# Patient Record
Sex: FEMALE | Race: WHITE | NOT HISPANIC OR LATINO | URBAN - METROPOLITAN AREA
[De-identification: names, ages, dates, MRNs, and addresses within clinical notes are randomized per-mention and may not be internally consistent; named-entity substitution may affect disease eponyms.]

---

## 2021-02-23 ENCOUNTER — APPOINTMENT (RX ONLY)
Dept: URBAN - METROPOLITAN AREA CLINIC 58 | Facility: CLINIC | Age: 61
Setting detail: DERMATOLOGY
End: 2021-02-23

## 2021-02-23 PROBLEM — D48.5 NEOPLASM OF UNCERTAIN BEHAVIOR OF SKIN: Status: ACTIVE | Noted: 2021-02-23

## 2021-02-23 PROCEDURE — 11102 TANGNTL BX SKIN SINGLE LES: CPT

## 2021-02-23 PROCEDURE — 11103 TANGNTL BX SKIN EA SEP/ADDL: CPT

## 2021-02-23 PROCEDURE — ? BIOPSY BY SHAVE METHOD

## 2021-02-23 NOTE — PROCEDURE: BIOPSY BY SHAVE METHOD
Detail Level: Detailed
Depth Of Biopsy: dermis
Was A Bandage Applied: Yes
Size Of Lesion In Cm: 2.1
X Size Of Lesion In Cm: 0
Biopsy Type: H and E
Biopsy Method: Personna blade
Anesthesia Type: 1% lidocaine with 1:200,000 epinephrine
Anesthesia Volume In Cc (Will Not Render If 0): 1
Hemostasis: Drysol and Monsel's
Wound Care: Petrolatum
Dressing: Band-Aid
Destruction After The Procedure: No
Type Of Destruction Used: Curettage
Curettage Text: The wound bed was treated with curettage after the biopsy was performed.
Cryotherapy Text: The wound bed was treated with cryotherapy after the biopsy was performed.
Electrodesiccation Text: The wound bed was treated with electrodesiccation after the biopsy was performed.
Electrodesiccation And Curettage Text: The wound bed was treated with electrodesiccation and curettage after the biopsy was performed.
Silver Nitrate Text: The wound bed was treated with silver nitrate after the biopsy was performed.
Lab: 6
Lab Facility: [id number]
Path Notes (To The Dermatopathologist): ***PER PROVIDER ONLY DR. GALLAGHERED TO READ***
Consent: Written consent was obtained and risks were reviewed including but not limited to scarring, infection, bleeding, scabbing, incomplete removal, nerve damage and allergy to anesthesia.
Post-Care Instructions: I reviewed with the patient in detail post-care instructions. Patient is to keep the biopsy site clean with soapy water, and then apply vaseline once daily until healed. Patient may apply hydrogen peroxide soaks to remove any crusting.
Notification Instructions: Patient will be notified of biopsy results. However, patient instructed to call the office if not contacted within 2 weeks.
Billing Type: Third-Party Bill
Information: Selecting Yes will display possible errors in your note based on the variables you have selected. This validation is only offered as a suggestion for you. PLEASE NOTE THAT THE VALIDATION TEXT WILL BE REMOVED WHEN YOU FINALIZE YOUR NOTE. IF YOU WANT TO FAX A PRELIMINARY NOTE YOU WILL NEED TO TOGGLE THIS TO 'NO' IF YOU DO NOT WANT IT IN YOUR FAXED NOTE.
Size Of Lesion In Cm: 0.8
Path Notes (To The Dermatopathologist): ***PER PROVIDER ONLY DR. GALLAGHERED TO READ***
Path Notes (To The Dermatopathologist): ***PER PROVIDER ONLY DR. GALLAGHERED TO READ***

## 2021-07-26 ENCOUNTER — NEW CATARACT EVAL (OUTPATIENT)
Dept: URBAN - METROPOLITAN AREA CLINIC 53 | Facility: CLINIC | Age: 61
End: 2021-07-26

## 2021-07-26 DIAGNOSIS — H25.13: ICD-10-CM

## 2021-07-26 PROCEDURE — 92015 DETERMINE REFRACTIVE STATE: CPT

## 2021-07-26 PROCEDURE — 92004 COMPRE OPH EXAM NEW PT 1/>: CPT

## 2021-07-26 PROCEDURE — 92134 CPTRZ OPH DX IMG PST SGM RTA: CPT

## 2021-07-26 ASSESSMENT — VISUAL ACUITY
OS_SC: 20/60+-
OD_GLARE: 20/400
OD_SC: 20/60-2
OS_GLARE: 20/400
OU_SC: 20/60+-

## 2021-07-26 ASSESSMENT — TONOMETRY
OD_IOP_MMHG: 18
OS_IOP_MMHG: 18

## 2021-07-26 ASSESSMENT — KERATOMETRY
OS_AXISANGLE2_DEGREES: 174
OD_K1POWER_DIOPTERS: 43.75
OD_AXISANGLE_DEGREES: 65
OS_K1POWER_DIOPTERS: 44.00
OD_K2POWER_DIOPTERS: 44.00
OS_K2POWER_DIOPTERS: 44.25
OD_AXISANGLE2_DEGREES: 155
OS_AXISANGLE_DEGREES: 84

## 2021-08-06 ENCOUNTER — BIOMETRY (OUTPATIENT)
Dept: URBAN - METROPOLITAN AREA CLINIC 53 | Facility: CLINIC | Age: 61
End: 2021-08-06

## 2021-08-06 DIAGNOSIS — H25.13: ICD-10-CM

## 2021-08-06 PROCEDURE — TOPOIOL CORNEAL TOPOGRAPHY-PREMIUM IOL

## 2021-08-06 PROCEDURE — 92136 OPHTHALMIC BIOMETRY: CPT

## 2021-08-06 ASSESSMENT — KERATOMETRY
OS_AXISANGLE2_DEGREES: 100
OD_AXISANGLE2_DEGREES: 171
OS_K2POWER_DIOPTERS: 44.37
OS_K1POWER_DIOPTERS: 44.00
OD_K2POWER_DIOPTERS: 44.00
OS_AXISANGLE_DEGREES: 010
OD_AXISANGLE_DEGREES: 081
OD_K1POWER_DIOPTERS: 43.87

## 2021-08-16 ENCOUNTER — PRE OP - CE/IOL OS (OUTPATIENT)
Dept: URBAN - METROPOLITAN AREA CLINIC 53 | Facility: CLINIC | Age: 61
End: 2021-08-16

## 2021-08-16 DIAGNOSIS — H25.12: ICD-10-CM

## 2021-08-16 PROCEDURE — PREOP PRE OP VISIT

## 2021-08-16 ASSESSMENT — KERATOMETRY
OD_K1POWER_DIOPTERS: 43.87
OS_K1POWER_DIOPTERS: 44.00
OD_AXISANGLE_DEGREES: 081
OS_AXISANGLE_DEGREES: 010
OS_AXISANGLE2_DEGREES: 100
OS_K2POWER_DIOPTERS: 44.37
OD_K2POWER_DIOPTERS: 44.00
OD_AXISANGLE2_DEGREES: 171

## 2021-08-16 ASSESSMENT — TONOMETRY
OD_IOP_MMHG: 17
OS_IOP_MMHG: 17

## 2021-08-16 ASSESSMENT — VISUAL ACUITY
OS_SC: 20/70
OD_SC: 20/70
OD_GLARE: 20/400
OS_GLARE: 20/400

## 2021-08-16 NOTE — PATIENT DISCUSSION
[employer] - MULTIFOCAL IOL/+FEMTO: Phacoemulsification with IOL: Eye: OS|DOS: 8/25/2021|Model: DFR00V|Power: 21. 0|Target: PLANO|Femto: YES|Arcs: 32 @ 170 ; 32 @ 350|Axis: /|Visc: DUET|Omidria: YES|10% Phenylephrine: NO|Epi-shugarcaine: YES|Phaco Setting: STANDARD|BSS+: NO|Trypan Blue: NO|CTR: NO|Olive Tip: NO|Atropine: NO|Pupilloplasty: NO|Notes: NONE.

## 2021-08-25 ENCOUNTER — SAME DAY PO (OUTPATIENT)
Dept: URBAN - METROPOLITAN AREA CLINIC 53 | Facility: CLINIC | Age: 61
End: 2021-08-25

## 2021-08-25 ENCOUNTER — SURGERY/PROCEDURE (OUTPATIENT)
Dept: URBAN - METROPOLITAN AREA SURGERY 16 | Facility: SURGERY | Age: 61
End: 2021-08-25

## 2021-08-25 DIAGNOSIS — Z98.42: ICD-10-CM

## 2021-08-25 DIAGNOSIS — H25.12: ICD-10-CM

## 2021-08-25 DIAGNOSIS — Z96.1: ICD-10-CM

## 2021-08-25 PROCEDURE — DFR00VFEMT SYNERGY IOL WITH FEMTO

## 2021-08-25 PROCEDURE — 66984 XCAPSL CTRC RMVL W/O ECP: CPT

## 2021-08-25 PROCEDURE — 99024 POSTOP FOLLOW-UP VISIT: CPT

## 2021-08-25 RX ORDER — BRIMONIDINE TARTRATE, TIMOLOL MALEATE 2; 5 MG/ML; MG/ML: 1 SOLUTION/ DROPS OPHTHALMIC TWICE A DAY

## 2021-08-25 ASSESSMENT — KERATOMETRY
OS_K2POWER_DIOPTERS: 44.37
OS_AXISANGLE2_DEGREES: 100
OS_AXISANGLE_DEGREES: 010
OD_K2POWER_DIOPTERS: 44.00
OD_AXISANGLE_DEGREES: 081
OD_K1POWER_DIOPTERS: 43.87
OD_K2POWER_DIOPTERS: 44.00
OS_K1POWER_DIOPTERS: 44.00
OD_K1POWER_DIOPTERS: 43.87
OD_AXISANGLE_DEGREES: 081
OS_K2POWER_DIOPTERS: 44.37
OD_AXISANGLE2_DEGREES: 171
OS_AXISANGLE2_DEGREES: 100
OS_K1POWER_DIOPTERS: 44.00
OD_AXISANGLE2_DEGREES: 171
OS_AXISANGLE_DEGREES: 010

## 2021-08-25 ASSESSMENT — VISUAL ACUITY: OS_SC: 20/70-1

## 2021-08-25 ASSESSMENT — TONOMETRY
OS_IOP_MMHG: 38
OS_IOP_MMHG: 10

## 2021-08-25 NOTE — PATIENT DISCUSSION
IOP 38. Burped in office by Dr. Brian Garcia. Start Combigan OS BID until f/u appointment. Will monitor.

## 2021-08-30 ENCOUNTER — PRE OP - CE/IOL OD / 1 WEEK PO OS (OUTPATIENT)
Dept: URBAN - METROPOLITAN AREA CLINIC 53 | Facility: CLINIC | Age: 61
End: 2021-08-30

## 2021-08-30 DIAGNOSIS — H25.811: ICD-10-CM

## 2021-08-30 PROCEDURE — PREOP PRE OP VISIT

## 2021-08-30 PROCEDURE — 92136 - 2N OPHTHALMIC BIOMETRY BY PARTIAL COHERENCE INTERFEROMETRY WITH INTRAOCULAR LENS POWER CALCULATION

## 2021-08-30 ASSESSMENT — VISUAL ACUITY
OD_SC: 20/60
OS_SC: 20/25
OD_OTHER: J2 @ 18 INCHES

## 2021-08-30 ASSESSMENT — KERATOMETRY
OS_AXISANGLE2_DEGREES: 100
OS_K1POWER_DIOPTERS: 44.00
OD_AXISANGLE2_DEGREES: 171
OS_K2POWER_DIOPTERS: 44.37
OD_AXISANGLE_DEGREES: 081
OS_AXISANGLE_DEGREES: 010
OD_K1POWER_DIOPTERS: 43.87
OD_K2POWER_DIOPTERS: 44.00

## 2021-08-30 ASSESSMENT — TONOMETRY
OS_IOP_MMHG: 12
OD_IOP_MMHG: 14

## 2021-08-30 NOTE — PATIENT DISCUSSION
[employer] - MULTIFOCAL IOL/+FEMTO: Phacoemulsification with IOL: Eye: RIGHT|DOS: 9/8/21|Model: [id number]|Power: 21. 5|Target: PLANO|Femto: YES|Arcs: 29 @ 120 ; 29 @ 300|Visc: DUET|Omidria: YES|10% Phenylephrine: NO|Epi-shugarcaine: YES|Phaco Setting: STD|BSS+: NO|Trypan Blue: NO|CTR: NO|Olive Tip: NO|Atropine: NO|Pupilloplasty: NO|Notes: NO.

## 2021-09-08 ENCOUNTER — SURGERY/PROCEDURE (OUTPATIENT)
Dept: URBAN - METROPOLITAN AREA SURGERY 16 | Facility: SURGERY | Age: 61
End: 2021-09-08

## 2021-09-08 ENCOUNTER — SAME DAY PO - CE/IOL (OUTPATIENT)
Dept: URBAN - METROPOLITAN AREA CLINIC 53 | Facility: CLINIC | Age: 61
End: 2021-09-08

## 2021-09-08 DIAGNOSIS — Z98.41: ICD-10-CM

## 2021-09-08 DIAGNOSIS — H25.811: ICD-10-CM

## 2021-09-08 PROCEDURE — 66984 XCAPSL CTRC RMVL W/O ECP: CPT

## 2021-09-08 PROCEDURE — DFR00VFEMT SYNERGY IOL WITH FEMTO

## 2021-09-08 PROCEDURE — 99024 POSTOP FOLLOW-UP VISIT: CPT

## 2021-09-08 RX ORDER — BRIMONIDINE TARTRATE, TIMOLOL MALEATE 2; 5 MG/ML; MG/ML: 1 SOLUTION/ DROPS OPHTHALMIC TWICE A DAY

## 2021-09-08 ASSESSMENT — TONOMETRY
OD_IOP_MMHG: 09
OD_IOP_MMHG: 38

## 2021-09-08 ASSESSMENT — KERATOMETRY
OD_K2POWER_DIOPTERS: 44.00
OD_AXISANGLE_DEGREES: 081
OD_K1POWER_DIOPTERS: 43.87
OS_AXISANGLE_DEGREES: 010
OS_AXISANGLE2_DEGREES: 100
OS_AXISANGLE2_DEGREES: 100
OD_K1POWER_DIOPTERS: 43.87
OS_K2POWER_DIOPTERS: 44.37
OS_K1POWER_DIOPTERS: 44.00
OD_AXISANGLE2_DEGREES: 171
OS_K2POWER_DIOPTERS: 44.37
OS_AXISANGLE_DEGREES: 010
OD_AXISANGLE_DEGREES: 081
OD_AXISANGLE2_DEGREES: 171
OS_K1POWER_DIOPTERS: 44.00
OD_K2POWER_DIOPTERS: 44.00

## 2021-09-08 ASSESSMENT — VISUAL ACUITY: OD_SC: 20/60-2

## 2021-09-08 NOTE — PROCEDURE NOTE: SURGICAL
"<span style=""color: [id number]; font-family: amos [facility]

## 2021-09-13 ENCOUNTER — 1 WEEK PO - CE/IOL (OUTPATIENT)
Dept: URBAN - METROPOLITAN AREA CLINIC 53 | Facility: CLINIC | Age: 61
End: 2021-09-13

## 2021-09-13 DIAGNOSIS — Z98.41: ICD-10-CM

## 2021-09-13 PROCEDURE — 99024 POSTOP FOLLOW-UP VISIT: CPT

## 2021-09-13 ASSESSMENT — VISUAL ACUITY
OS_SC: J5
OU_SC: 20/25
OD_SC: J1
OS_SC: 20/30-
OD_SC: J1
OD_SC: 20/25
OS_SC: J3

## 2021-09-13 ASSESSMENT — TONOMETRY
OD_IOP_MMHG: 15
OS_IOP_MMHG: 16

## 2021-09-13 ASSESSMENT — KERATOMETRY
OD_K2POWER_DIOPTERS: 44.00
OD_AXISANGLE2_DEGREES: 171
OS_AXISANGLE2_DEGREES: 100
OS_AXISANGLE_DEGREES: 010
OS_K2POWER_DIOPTERS: 44.37
OD_K1POWER_DIOPTERS: 43.87
OD_AXISANGLE_DEGREES: 081
OS_K1POWER_DIOPTERS: 44.00

## 2021-10-04 ENCOUNTER — 4 WEEK PO - CE/IOL (OUTPATIENT)
Dept: URBAN - METROPOLITAN AREA CLINIC 53 | Facility: CLINIC | Age: 61
End: 2021-10-04

## 2021-10-04 DIAGNOSIS — Z98.41: ICD-10-CM

## 2021-10-04 DIAGNOSIS — Z98.42: ICD-10-CM

## 2021-10-04 DIAGNOSIS — Z96.1: ICD-10-CM

## 2021-10-04 PROCEDURE — 99024 POSTOP FOLLOW-UP VISIT: CPT

## 2021-10-04 ASSESSMENT — KERATOMETRY
OD_K1POWER_DIOPTERS: 43.87
OD_AXISANGLE2_DEGREES: 171
OS_K2POWER_DIOPTERS: 44.37
OD_AXISANGLE_DEGREES: 081
OS_AXISANGLE2_DEGREES: 100
OD_K2POWER_DIOPTERS: 44.00
OS_K1POWER_DIOPTERS: 44.00
OS_AXISANGLE_DEGREES: 010

## 2021-10-04 ASSESSMENT — TONOMETRY
OS_IOP_MMHG: 13
OD_IOP_MMHG: 13

## 2021-10-04 ASSESSMENT — VISUAL ACUITY
OS_SC: 20/25-1
OD_SC: 20/20-1
OU_SC: J1+
OU_SC: 20/25-1
OU_SC: J1-

## 2022-02-25 ENCOUNTER — COMPREHENSIVE EXAM (OUTPATIENT)
Dept: URBAN - METROPOLITAN AREA CLINIC 53 | Facility: CLINIC | Age: 62
End: 2022-02-25

## 2022-02-25 DIAGNOSIS — H02.052: ICD-10-CM

## 2022-02-25 DIAGNOSIS — H01.00A: ICD-10-CM

## 2022-02-25 DIAGNOSIS — H20.043: ICD-10-CM

## 2022-02-25 DIAGNOSIS — H01.00B: ICD-10-CM

## 2022-02-25 DIAGNOSIS — H26.493: ICD-10-CM

## 2022-02-25 DIAGNOSIS — H00.15: ICD-10-CM

## 2022-02-25 DIAGNOSIS — G24.5: ICD-10-CM

## 2022-02-25 DIAGNOSIS — H00.12: ICD-10-CM

## 2022-02-25 PROCEDURE — 67820 REVISE EYELASHES: CPT

## 2022-02-25 PROCEDURE — 92014 COMPRE OPH EXAM EST PT 1/>: CPT

## 2022-02-25 RX ORDER — PREDNISOLONE ACETATE 10 MG/ML: 1 SUSPENSION/ DROPS OPHTHALMIC

## 2022-02-25 ASSESSMENT — VISUAL ACUITY
OU_SC: J1
OS_PH: 20/25
OD_SC: J1
OD_SC: 20/25-2
OS_GLARE: 20/80
OS_SC: J5
OS_SC: 20/50
OU_SC: 20/25
OD_GLARE: 20/40
OD_GLARE: 20/60
OS_GLARE: 20/50

## 2022-02-25 ASSESSMENT — TONOMETRY
OS_IOP_MMHG: 12
OD_IOP_MMHG: 12

## 2022-02-25 NOTE — PATIENT DISCUSSION
Patient admits to chronic styes typically twice a year. Recommended changing eye makeup remover to Erase your Face and to use warm compresses.

## 2022-02-25 NOTE — PATIENT DISCUSSION
Patient stated she has been using stye medication but doesn't seem to be working. Patient denies pain.  Advised patient Dr. Madison Amezcua can remove it and told patient we can send a referral over to have it removed. Advised patient to use warm compresses. Patient would like to try warm compresses before proceeding with having it removed.

## 2022-02-25 NOTE — PATIENT DISCUSSION
Patient to start Prednisolone Acetate three times a day for 3 weeks. Shake Vigorously.  F/u in [address] Vonnie Henderson.